# Patient Record
Sex: FEMALE | Race: OTHER | URBAN - METROPOLITAN AREA
[De-identification: names, ages, dates, MRNs, and addresses within clinical notes are randomized per-mention and may not be internally consistent; named-entity substitution may affect disease eponyms.]

---

## 2020-01-02 ENCOUNTER — IMPORTED ENCOUNTER (OUTPATIENT)
Dept: URBAN - METROPOLITAN AREA CLINIC 9 | Facility: CLINIC | Age: 70
End: 2020-01-02

## 2020-03-06 ENCOUNTER — IMPORTED ENCOUNTER (OUTPATIENT)
Dept: URBAN - METROPOLITAN AREA CLINIC 9 | Facility: CLINIC | Age: 70
End: 2020-03-06

## 2020-05-28 NOTE — PATIENT DISCUSSION
Continue: prednisol ace-gatiflox-bromfen (prednisol ace-gatiflox-bromfen): drops,suspension: 1-0.5-0.075% 1 drop three times a day as directed into affected eye 05-

## 2020-05-28 NOTE — PATIENT DISCUSSION
Surgery  Counseling: I have discussed the option of glasses versus cataract surgery versus following . It was explained that when vision no longer meets the patient's visual needs and a new prescription for glasses is not likely to improve all of the patient's visual symptoms, the option of cataract surgery is a reasonable next step. It was explained that there is no guarantee that removing the cataract will improve their visual symptoms, however; it is believed that the cataract is contributing to the patient's visual impairment and surgery may significantly improve both the visual and functional status of the patient. The risks, benefits and alternatives of surgery were discussed with the patient. After this discussion, the patient desires to proceed with cataract surgery with implantation of an intraocular lens to improve vision for glare while driving at night.

## 2020-06-30 NOTE — PATIENT DISCUSSION
Stopped Today: prednisol ace-gatiflox-bromfen (prednisol ace-gatiflox-bromfen): drops,suspension: 1-0.5-0.075% 1 drop three times a day as directed into affected eye 05-

## 2020-07-23 ENCOUNTER — IMPORTED ENCOUNTER (OUTPATIENT)
Dept: URBAN - METROPOLITAN AREA CLINIC 9 | Facility: CLINIC | Age: 70
End: 2020-07-23

## 2020-09-17 ENCOUNTER — IMPORTED ENCOUNTER (OUTPATIENT)
Dept: URBAN - METROPOLITAN AREA CLINIC 9 | Facility: CLINIC | Age: 70
End: 2020-09-17

## 2020-10-14 ENCOUNTER — IMPORTED ENCOUNTER (OUTPATIENT)
Dept: URBAN - METROPOLITAN AREA CLINIC 9 | Facility: CLINIC | Age: 70
End: 2020-10-14

## 2020-10-19 ENCOUNTER — IMPORTED ENCOUNTER (OUTPATIENT)
Dept: URBAN - METROPOLITAN AREA CLINIC 9 | Facility: CLINIC | Age: 70
End: 2020-10-19

## 2020-10-28 ENCOUNTER — IMPORTED ENCOUNTER (OUTPATIENT)
Dept: URBAN - METROPOLITAN AREA CLINIC 9 | Facility: CLINIC | Age: 70
End: 2020-10-28

## 2020-11-13 ENCOUNTER — IMPORTED ENCOUNTER (OUTPATIENT)
Dept: URBAN - METROPOLITAN AREA CLINIC 9 | Facility: CLINIC | Age: 70
End: 2020-11-13

## 2021-09-23 ENCOUNTER — IMPORTED ENCOUNTER (OUTPATIENT)
Dept: URBAN - METROPOLITAN AREA CLINIC 9 | Facility: CLINIC | Age: 71
End: 2021-09-23

## 2021-09-23 PROBLEM — E11.9: Noted: 2021-09-23

## 2021-09-23 PROBLEM — H35.363: Noted: 2021-09-23

## 2021-09-23 PROBLEM — H26.493: Noted: 2021-09-23

## 2021-09-23 PROBLEM — H04.123: Noted: 2021-09-23

## 2021-09-23 PROBLEM — H02.413: Noted: 2021-09-23

## 2021-09-23 PROBLEM — H11.823: Noted: 2021-09-23

## 2021-10-16 ASSESSMENT — PACHYMETRY
OS_CT_UM: 569.0
OD_CT_UM: 576.0

## 2021-10-16 ASSESSMENT — KERATOMETRY
OD_K1POWER_DIOPTERS: 44.25
OS_AXISANGLE2_DEGREES: 115
OD_AXISANGLE_DEGREES: 15
OD_AXISANGLE2_DEGREES: 105
OD_K1POWER_DIOPTERS: 44
OD_AXISANGLE_DEGREES: 180
OS_AXISANGLE_DEGREES: 25
OS_AXISANGLE2_DEGREES: 115
OS_AXISANGLE_DEGREES: 25
OD_AXISANGLE2_DEGREES: 90
OS_K1POWER_DIOPTERS: 44.25
OS_K1POWER_DIOPTERS: 44
OS_K2POWER_DIOPTERS: 44.25
OS_K2POWER_DIOPTERS: 44.5
OD_K2POWER_DIOPTERS: 44.25
OD_K2POWER_DIOPTERS: 44.25

## 2021-10-16 ASSESSMENT — TONOMETRY
OS_IOP_MMHG: 16
OD_IOP_MMHG: 17
OS_IOP_MMHG: 13
OD_IOP_MMHG: 15
OS_IOP_MMHG: 14
OS_IOP_MMHG: 19
OD_IOP_MMHG: 20
OS_IOP_MMHG: 16
OD_IOP_MMHG: 15
OS_IOP_MMHG: 14
OS_IOP_MMHG: 19
OD_IOP_MMHG: 13

## 2021-10-16 ASSESSMENT — VISUAL ACUITY
OS_CC: 20/20 SN
OS_SC: 20/20 -2 SN
OD_SC: 20/50 - SN
OS_SC: 20/30 -2 SN
OS_CC: 20/20 SN
OS_SC: 20/20 - SN
OD_CC: 20/20 SN
OS_CC: 20/25 -2 SN
OD_CC: 20/20 SN
OS_SC: 20/20 SN
OD_CC: 20/20 SN
OD_SC: 20/400 SN
OS_SC: 20/70 + SN
OS_SC: 20/25 - SN
OS_CC: 20/20 SN
OS_CC: 20/30 - SN
OD_CC: 20/20 SN
OS_CC: 20/20 SN
OD_CC: 20/25 -2 SN
OD_CC: 20/20 - SN
OD_SC: 20/200 SN
OD_SC: 20/40 -2 SN

## 2021-11-15 ENCOUNTER — VISUAL FIELD ONLY (OUTPATIENT)
Dept: URBAN - METROPOLITAN AREA CLINIC 9 | Facility: CLINIC | Age: 71
End: 2021-11-15

## 2021-11-15 DIAGNOSIS — H40.013: ICD-10-CM

## 2021-11-15 PROCEDURE — 92083 EXTENDED VISUAL FIELD XM: CPT

## 2021-11-15 ASSESSMENT — KERATOMETRY
OS_K2POWER_DIOPTERS: 44.5
OS_K1POWER_DIOPTERS: 44.25
OS_AXISANGLE2_DEGREES: 115
OD_K1POWER_DIOPTERS: 44.25
OD_K2POWER_DIOPTERS: 44.25
OD_AXISANGLE2_DEGREES: 90
OS_AXISANGLE_DEGREES: 25
OD_AXISANGLE_DEGREES: 180

## 2024-06-06 ENCOUNTER — ESTABLISHED PATIENT (OUTPATIENT)
Dept: URBAN - METROPOLITAN AREA CLINIC 18 | Facility: CLINIC | Age: 74
End: 2024-06-06

## 2024-06-06 DIAGNOSIS — E11.9: ICD-10-CM

## 2024-06-06 DIAGNOSIS — H02.413: ICD-10-CM

## 2024-06-06 DIAGNOSIS — H11.823: ICD-10-CM

## 2024-06-06 DIAGNOSIS — H40.013: ICD-10-CM

## 2024-06-06 DIAGNOSIS — H26.493: ICD-10-CM

## 2024-06-06 DIAGNOSIS — H04.123: ICD-10-CM

## 2024-06-06 PROCEDURE — 92133 CPTRZD OPH DX IMG PST SGM ON: CPT

## 2024-06-06 PROCEDURE — 92015 DETERMINE REFRACTIVE STATE: CPT

## 2024-06-06 PROCEDURE — 99214 OFFICE O/P EST MOD 30 MIN: CPT

## 2024-06-06 ASSESSMENT — VISUAL ACUITY
OD_SC: J2
OD_SC: 20/200
OU_SC: 20/40
OS_SC: 20/40-2

## 2024-06-06 ASSESSMENT — TONOMETRY
OD_IOP_MMHG: 13
OS_IOP_MMHG: 14